# Patient Record
Sex: FEMALE | Race: WHITE
[De-identification: names, ages, dates, MRNs, and addresses within clinical notes are randomized per-mention and may not be internally consistent; named-entity substitution may affect disease eponyms.]

---

## 2018-04-24 ENCOUNTER — HOSPITAL ENCOUNTER (OUTPATIENT)
Dept: HOSPITAL 95 - LAB | Age: 48
End: 2018-04-24
Attending: ADVANCED PRACTICE MIDWIFE
Payer: COMMERCIAL

## 2018-04-24 DIAGNOSIS — B37.9: Primary | ICD-10-CM

## 2018-08-22 ENCOUNTER — HOSPITAL ENCOUNTER (OUTPATIENT)
Dept: HOSPITAL 95 - ATC | Age: 48
Discharge: HOME | End: 2018-08-22
Attending: INTERNAL MEDICINE
Payer: COMMERCIAL

## 2018-08-22 DIAGNOSIS — C73: Primary | ICD-10-CM

## 2018-08-23 ENCOUNTER — HOSPITAL ENCOUNTER (OUTPATIENT)
Dept: HOSPITAL 95 - ATC | Age: 48
Discharge: HOME | End: 2018-08-23
Attending: INTERNAL MEDICINE
Payer: COMMERCIAL

## 2018-08-23 DIAGNOSIS — C73: Primary | ICD-10-CM

## 2020-06-30 NOTE — NUR
06/30/20 1333 Selma Arrington
PT MEDICATED 4MG ZOFRAN IV FOR NAUSEA AND GIVEN 350ML EXTRA LR IN SDU
PER DR MORALES

## 2020-06-30 NOTE — NUR
06/30/20 0926 Connie Arora
0907 AFTER FIRST DOSE OF PROPOFOL WAS GIVEN PT. STATED "I THINK I'M
GOING TO THROW UP." 0908 ZOFRAN 4MG IV GIVEN PER [facility] ORDER. PT. JUST
HAD DRY HEAVES. AFTER SECOND DOSE OF PROPOFOL PT. VERBALIZED FEELING
BETTER.